# Patient Record
Sex: FEMALE | Race: WHITE
[De-identification: names, ages, dates, MRNs, and addresses within clinical notes are randomized per-mention and may not be internally consistent; named-entity substitution may affect disease eponyms.]

---
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
## 2018-01-11 ENCOUNTER — HOSPITAL ENCOUNTER (EMERGENCY)
Dept: HOSPITAL 62 - ER | Age: 18
LOS: 1 days | Discharge: HOME | End: 2018-01-12
Payer: COMMERCIAL

## 2018-01-11 DIAGNOSIS — E10.65: ICD-10-CM

## 2018-01-11 DIAGNOSIS — O21.9: ICD-10-CM

## 2018-01-11 DIAGNOSIS — F17.210: ICD-10-CM

## 2018-01-11 DIAGNOSIS — O99.331: ICD-10-CM

## 2018-01-11 DIAGNOSIS — Z3A.01: ICD-10-CM

## 2018-01-11 DIAGNOSIS — O24.011: Primary | ICD-10-CM

## 2018-01-11 LAB
APPEARANCE UR: CLEAR
APTT PPP: (no result) S
BILIRUB UR QL STRIP: NEGATIVE
GLUCOSE UR STRIP-MCNC: >=500 MG/DL
KETONES UR STRIP-MCNC: 20 MG/DL
NITRITE UR QL STRIP: NEGATIVE
PH UR STRIP: 7 [PH] (ref 5–9)
PROT UR STRIP-MCNC: NEGATIVE MG/DL
SP GR UR STRIP: 1.02
UROBILINOGEN UR-MCNC: NEGATIVE MG/DL (ref ?–2)

## 2018-01-11 PROCEDURE — 36415 COLL VENOUS BLD VENIPUNCTURE: CPT

## 2018-01-11 PROCEDURE — 81001 URINALYSIS AUTO W/SCOPE: CPT

## 2018-01-11 PROCEDURE — 96360 HYDRATION IV INFUSION INIT: CPT

## 2018-01-11 PROCEDURE — 93976 VASCULAR STUDY: CPT

## 2018-01-11 PROCEDURE — 99406 BEHAV CHNG SMOKING 3-10 MIN: CPT

## 2018-01-11 PROCEDURE — 76817 TRANSVAGINAL US OBSTETRIC: CPT

## 2018-01-11 PROCEDURE — 82962 GLUCOSE BLOOD TEST: CPT

## 2018-01-11 PROCEDURE — 82803 BLOOD GASES ANY COMBINATION: CPT

## 2018-01-11 PROCEDURE — 80053 COMPREHEN METABOLIC PANEL: CPT

## 2018-01-11 PROCEDURE — 81025 URINE PREGNANCY TEST: CPT

## 2018-01-11 PROCEDURE — 99285 EMERGENCY DEPT VISIT HI MDM: CPT

## 2018-01-11 PROCEDURE — 84702 CHORIONIC GONADOTROPIN TEST: CPT

## 2018-01-11 PROCEDURE — 85025 COMPLETE CBC W/AUTO DIFF WBC: CPT

## 2018-01-12 VITALS — DIASTOLIC BLOOD PRESSURE: 62 MMHG | SYSTOLIC BLOOD PRESSURE: 105 MMHG

## 2018-01-12 LAB
ADD MANUAL DIFF: NO
ALBUMIN SERPL-MCNC: 4.6 G/DL (ref 3.7–5.6)
ALP SERPL-CCNC: 117 U/L (ref 50–135)
ALT SERPL-CCNC: 34 U/L (ref 5–35)
ANION GAP SERPL CALC-SCNC: 21 MMOL/L (ref 5–19)
AST SERPL-CCNC: 20 U/L (ref 5–30)
BASE EXCESS BLDV CALC-SCNC: -3 MMOL/L
BASOPHILS # BLD AUTO: 0.1 10^3/UL (ref 0–0.2)
BASOPHILS NFR BLD AUTO: 0.4 % (ref 0–2)
BILIRUB DIRECT SERPL-MCNC: 0.3 MG/DL (ref 0–0.4)
BILIRUB SERPL-MCNC: 1 MG/DL (ref 0.2–1.3)
BUN SERPL-MCNC: 15 MG/DL (ref 7–20)
CALCIUM: 10.7 MG/DL (ref 8.4–10.2)
CHLORIDE SERPL-SCNC: 89 MMOL/L (ref 98–107)
CO2 SERPL-SCNC: 19 MMOL/L (ref 22–30)
EOSINOPHIL # BLD AUTO: 0 10^3/UL (ref 0–0.6)
EOSINOPHIL NFR BLD AUTO: 0.3 % (ref 0–6)
ERYTHROCYTE [DISTWIDTH] IN BLOOD BY AUTOMATED COUNT: 14.5 % (ref 11.5–14)
GLUCOSE SERPL-MCNC: 603 MG/DL (ref 75–110)
HCO3 BLDV-SCNC: 22.5 MMOL/L (ref 20–32)
HCT VFR BLD CALC: 41.1 % (ref 36–47)
HGB BLD-MCNC: 13 G/DL (ref 12–15.5)
LYMPHOCYTES # BLD AUTO: 2.9 10^3/UL (ref 0.5–4.7)
LYMPHOCYTES NFR BLD AUTO: 20.1 % (ref 13–45)
MCH RBC QN AUTO: 29.5 PG (ref 27–33.4)
MCHC RBC AUTO-ENTMCNC: 31.7 G/DL (ref 32–36)
MCV RBC AUTO: 93 FL (ref 80–97)
MONOCYTES # BLD AUTO: 0.8 10^3/UL (ref 0.1–1.4)
MONOCYTES NFR BLD AUTO: 5.8 % (ref 3–13)
NEUTROPHILS # BLD AUTO: 10.7 10^3/UL (ref 1.7–8.2)
NEUTS SEG NFR BLD AUTO: 73.4 % (ref 42–78)
PCO2 BLDV: 41.9 MMHG (ref 35–63)
PH BLDV: 7.35 [PH] (ref 7.3–7.42)
PLATELET # BLD: 396 10^3/UL (ref 150–450)
POTASSIUM SERPL-SCNC: 5.2 MMOL/L (ref 3.6–5)
PROT SERPL-MCNC: 6.9 G/DL (ref 6.3–8.2)
RBC # BLD AUTO: 4.42 10^6/UL (ref 3.72–5.28)
SODIUM SERPL-SCNC: 128.7 MMOL/L (ref 137–145)
TOTAL CELLS COUNTED % (AUTO): 100 %
WBC # BLD AUTO: 14.6 10^3/UL (ref 4–10.5)

## 2018-01-12 NOTE — ER DOCUMENT REPORT
ED General





- General


Chief Complaint: High Blood Sugar


Stated Complaint: HIGH BLOOD SUGAR


Time Seen by Provider: 01/12/18 00:45


Mode of Arrival: Medic


Information source: Patient, Law Enforcement, Emergency Med Personnel


Notes: 





17-year-old female presents from half-way with complaints of hyperglycemia.  

Patient has a history of diabetes type 1, patient notes that in retirement for the 

past 2 weeks she has not been receiving her insulin off the sliding scale that 

incorporates her meals. pt denies any vaginal bleeding ,discharge, denies any 

urinary complaints 


TRAVEL OUTSIDE OF THE [place] IN LAST 30 DAYS: No





- HPI


Onset: Just prior to arrival


Onset/Duration: Sudden


Quality of pain: No pain


Severity: Mild


Pain Level: Denies


Associated symptoms: Nausea, Vomiting


Exacerbated by: Denies


Relieved by: Supine


Similar symptoms previously: Yes


Recently seen / treated by doctor: Yes





- Related Data


Allergies/Adverse Reactions: 


 





No Known Allergies Allergy (Verified 01/11/18 22:46)


 











Past Medical History





- Social History


Smoking Status: Current Every Day Smoker


Cigarette use (# per day): Yes


Chew tobacco use (# tins/day): No


Smoking Education Provided: Yes - Patient counselled regarding cessation for 4 

minutes


Family History: Reviewed & Not Pertinent


Patient has suicidal ideation: No


Patient has homicidal ideation: No


Endocrine Medical History: Reports: Hx Diabetes Mellitus Type 1, Hx Diabetes 

Mellitus Type 2


Renal/ Medical History: Denies: Hx Peritoneal Dialysis


Psychiatric Medical History: Reports: Hx Depression





- Immunizations


Immunizations up to date: Yes


Hx Diphtheria, Pertussis, Tetanus Vaccination: Yes





Review of Systems





- Review of Systems


Notes: 





REVIEW OF SYSTEMS:


CONSTITUTIONAL :  Denies fever,  chills, or sweats.  Denies recent illness.


EENT:   Denies eye, ear, throat, or mouth pain or symptoms.  Denies nasal or 

sinus congestion or discharge.  Denies throat, tongue, or mouth swelling or 

difficulty swallowing.


CARDIOVASCULAR:  Denies chest pain.  Denies palpitations or racing or irregular 

heart beat.  Denies ankle edema.


RESPIRATORY:  Denies cough, cold, or chest congestion.  Denies shortness of 

breath, difficulty breathing, or wheezing.


GASTROINTESTINAL: Admits to nausea vomiting


GENITOURINARY:  Denies difficulty urinating, painful urination, burning, 

frequency, blood in urine, or discharge.


FEMALE  GENITOURINARY:  Denies vaginal bleeding, heavy or abnormal periods, 

irregular periods.  Denies vaginal discharge or odor. 


MUSCULOSKELETAL:  Denies back or neck pain or stiffness.  Denies joint pain or 

swelling.


SKIN:   Denies rash, lesions or sores.


HEMATOLOGIC :   Denies easy bruising or bleeding.


LYMPHATIC:  Denies swollen, enlarged glands.


NEUROLOGICAL:  Denies confusion or altered mental status.  Denies passing out 

or loss of consciousness.  Denies dizziness or lightheadedness.  Denies 

headache.  Denies weakness or paralysis or loss of use of either side.  Denies 

problems with gait or speech.  Denies sensory loss, numbness, or tingling.  

Denies seizures.


PSYCHIATRIC:  Denies anxiety or stress.  Denies depression, suicidal ideation, 

or homicidal ideation.





ALL OTHER SYSTEMS REVIEWED AND NEGATIVE.











PHYSICAL EXAMINATION:





GENERAL: Well-appearing, well-nourished and in no acute distress.





HEAD: Atraumatic, normocephalic.





EYES: Pupils equal round and reactive to light, extraocular movements intact, 

conjunctiva are normal.





ENT: Nares patent, oropharynx clear without exudates.  Moist mucous membranes.





NECK: Normal range of motion, supple without lymphadenopathy





LUNGS: Breath sounds clear to auscultation bilaterally and equal.  No wheezes 

rales or rhonchi.





HEART: Regular rate and rhythm without murmurs





ABDOMEN: Soft, nontender, nondistended abdomen.  No guarding, no rebound.  No 

masses appreciated.





Female : deferred





Musculoskeletal: Normal range of motion, no pitting or edema.  No cyanosis.





NEUROLOGICAL: Cranial nerves grossly intact.  Normal speech, normal gait.  

Normal sensory, motor exams





PSYCH: Normal mood, normal affect.





SKIN: Warm, Dry, normal turgor, no rashes or lesions noted.

















Dictation was performed using Dragon voice recognition software





Physical Exam





- Vital signs


Vitals: 


 











Temp Pulse Resp BP Pulse Ox


 


 98.4 F   86   18   128/71 H  98 


 


 01/11/18 22:49  01/11/18 22:49  01/11/18 22:49  01/11/18 22:49  01/11/18 22:49














Course





- Re-evaluation


Re-evalutation: 


On arrival patient is noted to have a blood sugar over 600, she overall looks 

well was placed on the monitor.  IV fluids were started and patient was given 

insulin bolus.  I believe her hyperglycemia secondary to medications not being 

appropriate


01/12/18 04:25


Patient's blood sugars improved significantly is down to 182 at this time, hcg 

is positive 


01/12/18 06:21


Ultrasound was consistent with a 6 week pregnancy, patient was made aware of 

this, she states she had Raynaud's that she is pregnant, she denies any 

complaints otherwise








Given the patient's symptoms have improved as well as her glucose has improved 

I believe she is stable for discharge.  Very strict return precautions have 

been provided, prenatal vitamins have been written for the patient








After performing a Medical Screening Examination, I estimate there is LOW risk 

for ACUTE APPENDICITIS, BOWEL OBSTRUCTION, ACUTE CHOLECYSTITIS, PERFORATED 

DIVERTICULITIS, INCARCERATED HERNIA, PANCREATITIS, PELVIC INFLAMMATORY DISEASE, 

PERFORATED ULCER, ECTOPIC PREGNANCY, or TUBO-OVARIAN ABSCESS, thus I consider 

the discharge disposition reasonable. Also, there is no evidence or peritonitis

, sepsis, or toxicity. I have reevaluated this patient multiple times and no 

significant life threatening changes are noted. The patient and I have 

discussed the diagnosis and risks, and we agree with discharging home with 

close follow-up with the understanding that symptoms and presentations can 

change. We also discussed returning to the Emergency Department immediately if 

new or worsening symptoms occur. We have discussed the symptoms which are most 

concerning (e.g., bloody stool, fever, changing or worsening pain, vomiting) 

that necessitate immediate return.





- Vital Signs


Vital signs: 


 











Temp Pulse Resp BP Pulse Ox


 


 98.4 F   86   18   128/71 H  98 


 


 01/11/18 22:49  01/11/18 22:49  01/11/18 22:49  01/11/18 22:49  01/11/18 22:49














- Laboratory


Result Diagrams: 


 01/12/18 01:00





 01/12/18 01:00


Laboratory results interpreted by me: 


 











  01/11/18 01/11/18 01/12/18





  23:13 23:13 01:00


 


WBC    14.6 H


 


MCHC    31.7 L


 


RDW    14.5 H


 


Absolute Neutrophils    10.7 H


 


Sodium   


 


Potassium   


 


Chloride   


 


Carbon Dioxide   


 


Anion Gap   


 


Glucose   


 


POC Glucose   


 


Calcium   


 


Beta HCG, Quant   


 


Urine Glucose (UA)  >=500 H  


 


Urine Ketones  20 H  


 


Ur Leukocyte Esterase  TRACE H  


 


Urine HCG, Qual   POSITIVE H 














  01/12/18 01/12/18 01/12/18





  01:00 01:00 02:58


 


WBC   


 


MCHC   


 


RDW   


 


Absolute Neutrophils   


 


Sodium  128.7 L  


 


Potassium  5.2 H  


 


Chloride  89 L  


 


Carbon Dioxide  19 L  


 


Anion Gap  21 H  


 


Glucose  603 H*  


 


POC Glucose    387 H


 


Calcium  10.7 H  


 


Beta HCG, Quant   47231.00 H 


 


Urine Glucose (UA)   


 


Urine Ketones   


 


Ur Leukocyte Esterase   


 


Urine HCG, Qual   














  01/12/18





  04:24


 


WBC 


 


MCHC 


 


RDW 


 


Absolute Neutrophils 


 


Sodium 


 


Potassium 


 


Chloride 


 


Carbon Dioxide 


 


Anion Gap 


 


Glucose 


 


POC Glucose  182 H


 


Calcium 


 


Beta HCG, Quant 


 


Urine Glucose (UA) 


 


Urine Ketones 


 


Ur Leukocyte Esterase 


 


Urine HCG, Qual 














Critical Care Note





- Critical Care Note


Total time excluding time spent on procedures (mins): 34


Comments: 





34 minutes of critical care time spent in direct contact evaluating and 

reevaluating the patient, treating symptoms, reviewing labs and studies and 

speaking with family  and consultants excluding any procedures





Discharge





- Discharge


Clinical Impression: 


 Hyperglycemia, Pregnancy with 6 completed weeks gestation





Condition: Stable


Disposition: HOME, SELF-CARE


Instructions:  Hyperglycemia ([facility])


Additional Instructions: 


Please provide patient with insulin on sliding scale as well as calculating per 

meal as she does at home





Please provide prenatal vitamin daily





Return immediately if there are any other concerns


Prescriptions: 


[id number]/Iron Fum/Folic/Docusate [Prenatal 19 Tablet] 1 each PO DAILY #30 tablet

## 2018-01-12 NOTE — RADIOLOGY REPORT (SQ)
EXAM DESCRIPTION: U/S OB TRANSVAG W/DOPPLER



CLINICAL HISTORY: 17 years Female, + preg



COMPARISON: None.



TECHNIQUE: Complete transvaginal first trimester obstetrical

ultrasound.



FINDINGS: 



The uterus measures 4.6 x 5.5 x 4.4 cm. Cervix measures 2.5 cm.

Within the endometrial canal there is a gestational sac with a

yolk sac measuring 0.45 cm and a fetal pole with a crown-rump

length of 0.32 cm. This compatible with an estimated gestational

age of 6 weeks, 0 days. No fetal heart rate identified. No

myometrial abnormalities.



Trace free pelvic fluid.



The right ovary measures 2.1 x 3.1 x 2.3 cm. The left ovary

measures 2.1 x 3.3 x 1.6 cm. Limited color and spectral Doppler

imaging demonstrates flow within the ovaries bilaterally.



IMPRESSION:



1. There is a single intrauterine pregnancy with estimated

gestational age of 6 weeks, 0 days by crown-rump length. No fetal

cardiac activity identified. Differential considerations include

fetal demise and early normal pregnancy. Close continued

clinical, laboratory, and sonographic follow-up recommended.

## 2018-06-19 ENCOUNTER — HOSPITAL ENCOUNTER (EMERGENCY)
Dept: HOSPITAL 62 - ER | Age: 18
LOS: 1 days | Discharge: HOME | End: 2018-06-20
Payer: COMMERCIAL

## 2018-06-19 DIAGNOSIS — F17.210: ICD-10-CM

## 2018-06-19 DIAGNOSIS — Z79.4: ICD-10-CM

## 2018-06-19 DIAGNOSIS — E10.65: Primary | ICD-10-CM

## 2018-06-19 DIAGNOSIS — R55: ICD-10-CM

## 2018-06-19 DIAGNOSIS — R53.1: ICD-10-CM

## 2018-06-19 PROCEDURE — 81001 URINALYSIS AUTO W/SCOPE: CPT

## 2018-06-19 PROCEDURE — 36415 COLL VENOUS BLD VENIPUNCTURE: CPT

## 2018-06-19 PROCEDURE — 82803 BLOOD GASES ANY COMBINATION: CPT

## 2018-06-19 PROCEDURE — 80053 COMPREHEN METABOLIC PANEL: CPT

## 2018-06-19 PROCEDURE — 99285 EMERGENCY DEPT VISIT HI MDM: CPT

## 2018-06-19 PROCEDURE — 85025 COMPLETE CBC W/AUTO DIFF WBC: CPT

## 2018-06-19 PROCEDURE — 82962 GLUCOSE BLOOD TEST: CPT

## 2018-06-20 VITALS — DIASTOLIC BLOOD PRESSURE: 77 MMHG | SYSTOLIC BLOOD PRESSURE: 120 MMHG

## 2018-06-20 LAB
ADD MANUAL DIFF: NO
ALBUMIN SERPL-MCNC: 4 G/DL (ref 3.7–5.6)
ALP SERPL-CCNC: 108 U/L (ref 50–135)
ALT SERPL-CCNC: 31 U/L (ref 5–35)
ANION GAP SERPL CALC-SCNC: 16 MMOL/L (ref 5–19)
APPEARANCE UR: CLEAR
APTT PPP: (no result) S
AST SERPL-CCNC: 16 U/L (ref 5–30)
BASE EXCESS BLDV CALC-SCNC: -2.8 MMOL/L
BASOPHILS # BLD AUTO: 0 10^3/UL (ref 0–0.2)
BASOPHILS NFR BLD AUTO: 0.5 % (ref 0–2)
BILIRUB DIRECT SERPL-MCNC: 0.3 MG/DL (ref 0–0.4)
BILIRUB SERPL-MCNC: 1.2 MG/DL (ref 0.2–1.3)
BILIRUB UR QL STRIP: NEGATIVE
BUN SERPL-MCNC: 18 MG/DL (ref 7–20)
CALCIUM: 9.5 MG/DL (ref 8.4–10.2)
CHLORIDE SERPL-SCNC: 94 MMOL/L (ref 98–107)
CO2 SERPL-SCNC: 23 MMOL/L (ref 22–30)
EOSINOPHIL # BLD AUTO: 0.1 10^3/UL (ref 0–0.6)
EOSINOPHIL NFR BLD AUTO: 0.5 % (ref 0–6)
ERYTHROCYTE [DISTWIDTH] IN BLOOD BY AUTOMATED COUNT: 12.9 % (ref 11.5–14)
GLUCOSE SERPL-MCNC: 589 MG/DL (ref 75–110)
GLUCOSE UR STRIP-MCNC: >=500 MG/DL
HCO3 BLDV-SCNC: 22.4 MMOL/L (ref 20–32)
HCT VFR BLD CALC: 38.6 % (ref 36–47)
HGB BLD-MCNC: 12.6 G/DL (ref 12–15.5)
KETONES UR STRIP-MCNC: 20 MG/DL
LYMPHOCYTES # BLD AUTO: 2.6 10^3/UL (ref 0.5–4.7)
LYMPHOCYTES NFR BLD AUTO: 24.4 % (ref 13–45)
MCH RBC QN AUTO: 28.8 PG (ref 27–33.4)
MCHC RBC AUTO-ENTMCNC: 32.7 G/DL (ref 32–36)
MCV RBC AUTO: 88 FL (ref 80–97)
MONOCYTES # BLD AUTO: 0.8 10^3/UL (ref 0.1–1.4)
MONOCYTES NFR BLD AUTO: 7.9 % (ref 3–13)
NEUTROPHILS # BLD AUTO: 7.2 10^3/UL (ref 1.7–8.2)
NEUTS SEG NFR BLD AUTO: 66.7 % (ref 42–78)
NITRITE UR QL STRIP: NEGATIVE
PCO2 BLDV: 40.5 MMHG (ref 35–63)
PH BLDV: 7.36 [PH] (ref 7.3–7.42)
PH UR STRIP: 5 [PH] (ref 5–9)
PLATELET # BLD: 320 10^3/UL (ref 150–450)
POTASSIUM SERPL-SCNC: 4.9 MMOL/L (ref 3.6–5)
PROT SERPL-MCNC: 6.6 G/DL (ref 6.3–8.2)
PROT UR STRIP-MCNC: NEGATIVE MG/DL
RBC # BLD AUTO: 4.37 10^6/UL (ref 3.72–5.28)
SODIUM SERPL-SCNC: 132.6 MMOL/L (ref 137–145)
SP GR UR STRIP: 1.02
TOTAL CELLS COUNTED % (AUTO): 100 %
UROBILINOGEN UR-MCNC: NEGATIVE MG/DL (ref ?–2)
WBC # BLD AUTO: 10.8 10^3/UL (ref 4–10.5)

## 2018-06-20 NOTE — ER DOCUMENT REPORT
ED General





- General


Chief Complaint: High Blood Sugar


Stated Complaint: POSSIBLE HIGH GLUCOSE


Time Seen by Provider: 06/20/18 00:01


Mode of Arrival: Ambulatory


Information source: Patient, Parent


Notes: 





17 yr old female presents with hx of type 1 diabetes who is supposed to be on 

novolog with complaints of high blood sugar and feeling faint. pt denies any 

fevers or chills, denies any pain. pt noted that she ran out of her novolog and 

hasnt had it today. the pcp has not filled the medication in the pharmacy. 


TRAVEL OUTSIDE OF THE [place] IN LAST 30 DAYS: No





- HPI


Onset: Just prior to arrival


Onset/Duration: Sudden


Quality of pain: No pain


Severity: Moderate


Pain Level: Denies


Associated symptoms: Weakness


Exacerbated by: Denies


Relieved by: Denies


Similar symptoms previously: Yes


Recently seen / treated by doctor: Yes





- Related Data


Allergies/Adverse Reactions: 


 





No Known Allergies Allergy (Verified 01/11/18 22:46)


 











Past Medical History





- Social History


Smoking Status: Current Every Day Smoker


Cigarette use (# per day): Yes


Chew tobacco use (# tins/day): No


Smoking Education Provided: No


Family History: Reviewed & Not Pertinent


Endocrine Medical History: Reports: Hx Diabetes Mellitus Type 1, Hx Diabetes 

Mellitus Type 2


Renal/ Medical History: Denies: Hx Peritoneal Dialysis


Psychiatric Medical History: Reports: Hx Depression





- Immunizations


Immunizations up to date: Yes


Hx Diphtheria, Pertussis, Tetanus Vaccination: Yes





Review of Systems





- Review of Systems


Notes: 





REVIEW OF SYSTEMS:


CONSTITUTIONAL :  Denies fever,  chills, or sweats.  Denies recent illness.


EENT:   Denies eye, ear, throat, or mouth pain or symptoms.  Denies nasal or 

sinus congestion or discharge.  Denies throat, tongue, or mouth swelling or 

difficulty swallowing.


CARDIOVASCULAR:  Denies chest pain.  Denies palpitations or racing or irregular 

heart beat.  Denies ankle edema.


RESPIRATORY:  Denies cough, cold, or chest congestion.  Denies shortness of 

breath, difficulty breathing, or wheezing.


GASTROINTESTINAL:  Denies abdominal pain or distention.  Denies nausea, vomiting

, or diarrhea.  Denies blood in vomitus, stools, or per rectum.  Denies black, 

tarry stools.  Denies constipation. 


GENITOURINARY:  Denies difficulty urinating, painful urination, burning, 

frequency, blood in urine, or discharge.


FEMALE  GENITOURINARY:  Denies vaginal bleeding, heavy or abnormal periods, 

irregular periods.  Denies vaginal discharge or odor. 


MUSCULOSKELETAL:  Denies back or neck pain or stiffness.  Denies joint pain or 

swelling.


SKIN:   Denies rash, lesions or sores.


HEMATOLOGIC :   Denies easy bruising or bleeding.


LYMPHATIC:  Denies swollen, enlarged glands.


NEUROLOGICAL:  weakness


PSYCHIATRIC:  Denies anxiety or stress.  Denies depression, suicidal ideation, 

or homicidal ideation.





ALL OTHER SYSTEMS REVIEWED AND NEGATIVE.











PHYSICAL EXAMINATION:





GENERAL: Well-appearing, well-nourished and in no acute distress.





HEAD: Atraumatic, normocephalic.





EYES: Pupils equal round and reactive to light, extraocular movements intact, 

conjunctiva are normal.





ENT: Nares patent, oropharynx clear without exudates.  Moist mucous membranes.





NECK: Normal range of motion, supple without lymphadenopathy





LUNGS: Breath sounds clear to auscultation bilaterally and equal.  No wheezes 

rales or rhonchi.





HEART: Regular rate and rhythm without murmurs





ABDOMEN: Soft, nontender, nondistended abdomen.  No guarding, no rebound.  No 

masses appreciated.





Female : deferred





Musculoskeletal: Normal range of motion, no pitting or edema.  No cyanosis.





NEUROLOGICAL: Cranial nerves grossly intact.  Normal speech, normal gait.  

Normal sensory, motor exams





PSYCH: Normal mood, normal affect.





SKIN: Warm, Dry, normal turgor, no rashes or lesions noted. Ankle alarm noted 

















Dictation was performed using Dragon voice recognition software





Physical Exam





- Vital signs


Vitals: 


 











Temp Pulse Resp BP Pulse Ox


 


 98.3 F   95 H  18   128/73 H  97 


 


 06/19/18 22:57  06/19/18 22:57  06/19/18 22:57  06/19/18 22:57  06/19/18 22:57














Course





- Re-evaluation


Re-evalutation: 





06/20/18 00:09


pt overall looks well, does not appear to be in dka, her complaints seem to be 

secondary to lack of her medication, will treat with iv fluids and insulin. 

mother notes she can get medication in AM if i write it for her 


06/20/18 02:08


pts blood sugar has improved, she wishes to go home, will dc with mother and 

close return precautions 





After performing a Medical Screening Examination, I estimate there is LOW risk 

for ACUTE CORONARY SYNDROME, PULMONARY EMBOLI, RESPIRATORY FAILURE, SEPSIS OR 

MENINGITIS, thus I consider the discharge disposition reasonable.  I have 

reevaluated this patient multiple times and no significant life threatening 

changes are noted. The patient and I have discussed the diagnosis and risks, 

and we agree with discharging home with close follow-up. We also discussed 

returning to the Emergency Department immediately if new or worsening symptoms 

occur. We have discussed the symptoms which are most concerning (e.g., changing 

or worsening pain, trouble swallowing or breathing, neck stiffness, fever) that 

necessitate immediate return.





- Vital Signs


Vital signs: 


 











Temp Pulse Resp BP Pulse Ox


 


 98.3 F   95 H  18   128/73 H  97 


 


 06/19/18 22:57  06/19/18 22:57  06/19/18 22:57  06/19/18 22:57  06/19/18 22:57














- Laboratory


Result Diagrams: 


 06/20/18 00:07





 06/20/18 00:07


Laboratory results interpreted by me: 


 











  06/20/18 06/20/18 06/20/18





  00:07 00:07 01:03


 


WBC  10.8 H  


 


Sodium   132.6 L 


 


Chloride   94 L 


 


Glucose   589 H* 


 


Urine Glucose (UA)    >=500 H


 


Urine Ketones    20 H


 


Urine Blood    LARGE H














Discharge





- Discharge


Clinical Impression: 


 Hyperglycemia due to type 1 diabetes mellitus





Condition: Stable


Disposition: HOME, SELF-CARE


Instructions:  Hyperglycemia ([facility])


Prescriptions: 


Insulin Aspart [Novolog Insulin 100 Unit/1 ml 10 ml] 1 unit SUBCUT .SLD SCALE #

10 ml


Referrals: 


MILADIS DANIELSON MD [Primary Care Provider] - Follow up tomorrow

## 2019-01-22 ENCOUNTER — HOSPITAL ENCOUNTER (EMERGENCY)
Dept: HOSPITAL 62 - ER | Age: 19
Discharge: HOME | End: 2019-01-22
Payer: MEDICARE

## 2019-01-22 VITALS — DIASTOLIC BLOOD PRESSURE: 95 MMHG | SYSTOLIC BLOOD PRESSURE: 138 MMHG

## 2019-01-22 DIAGNOSIS — E10.9: ICD-10-CM

## 2019-01-22 DIAGNOSIS — R11.2: Primary | ICD-10-CM

## 2019-01-22 LAB
ADD MANUAL DIFF: NO
ALBUMIN SERPL-MCNC: 4.3 G/DL (ref 3.5–5)
ALP SERPL-CCNC: 108 U/L (ref 38–126)
ALT SERPL-CCNC: 34 U/L (ref 9–52)
ANION GAP SERPL CALC-SCNC: 9 MMOL/L (ref 5–19)
AST SERPL-CCNC: 18 U/L (ref 14–36)
BASOPHILS # BLD AUTO: 0.1 10^3/UL (ref 0–0.2)
BASOPHILS NFR BLD AUTO: 0.7 % (ref 0–2)
BILIRUB DIRECT SERPL-MCNC: 0.1 MG/DL (ref 0–0.4)
BILIRUB SERPL-MCNC: 0.3 MG/DL (ref 0.2–1.3)
BUN SERPL-MCNC: 8 MG/DL (ref 7–20)
CALCIUM: 9.6 MG/DL (ref 8.4–10.2)
CHLORIDE SERPL-SCNC: 104 MMOL/L (ref 98–107)
CO2 SERPL-SCNC: 26 MMOL/L (ref 22–30)
EOSINOPHIL # BLD AUTO: 0.1 10^3/UL (ref 0–0.6)
EOSINOPHIL NFR BLD AUTO: 0.6 % (ref 0–6)
ERYTHROCYTE [DISTWIDTH] IN BLOOD BY AUTOMATED COUNT: 13.8 % (ref 11.5–14)
GLUCOSE SERPL-MCNC: 230 MG/DL (ref 75–110)
HCT VFR BLD CALC: 38.3 % (ref 36–47)
HGB BLD-MCNC: 12.7 G/DL (ref 12–15.5)
LYMPHOCYTES # BLD AUTO: 3.1 10^3/UL (ref 0.5–4.7)
LYMPHOCYTES NFR BLD AUTO: 26.5 % (ref 13–45)
MCH RBC QN AUTO: 28.7 PG (ref 27–33.4)
MCHC RBC AUTO-ENTMCNC: 33.2 G/DL (ref 32–36)
MCV RBC AUTO: 87 FL (ref 80–97)
MONOCYTES # BLD AUTO: 0.7 10^3/UL (ref 0.1–1.4)
MONOCYTES NFR BLD AUTO: 5.8 % (ref 3–13)
NEUTROPHILS # BLD AUTO: 7.7 10^3/UL (ref 1.7–8.2)
NEUTS SEG NFR BLD AUTO: 66.4 % (ref 42–78)
PLATELET # BLD: 459 10^3/UL (ref 150–450)
POTASSIUM SERPL-SCNC: 4.5 MMOL/L (ref 3.6–5)
PROT SERPL-MCNC: 6.9 G/DL (ref 6.3–8.2)
RBC # BLD AUTO: 4.42 10^6/UL (ref 3.72–5.28)
SODIUM SERPL-SCNC: 139.2 MMOL/L (ref 137–145)
TOTAL CELLS COUNTED % (AUTO): 100 %
WBC # BLD AUTO: 11.6 10^3/UL (ref 4–10.5)

## 2019-01-22 PROCEDURE — 80053 COMPREHEN METABOLIC PANEL: CPT

## 2019-01-22 PROCEDURE — 36415 COLL VENOUS BLD VENIPUNCTURE: CPT

## 2019-01-22 PROCEDURE — 96361 HYDRATE IV INFUSION ADD-ON: CPT

## 2019-01-22 PROCEDURE — 96374 THER/PROPH/DIAG INJ IV PUSH: CPT

## 2019-01-22 PROCEDURE — 85025 COMPLETE CBC W/AUTO DIFF WBC: CPT

## 2019-01-22 PROCEDURE — 99284 EMERGENCY DEPT VISIT MOD MDM: CPT

## 2019-01-22 NOTE — ER DOCUMENT REPORT
ED GI/





- General


Chief Complaint: Vomiting


Stated Complaint: VOMITING


Time Seen by Provider: 01/22/19 13:44


Primary Care Provider: 


MILADIS DANIELSON MD [Primary Care Provider] - Follow up as needed


Mode of Arrival: Ambulatory


Notes: 








Patient is a 18-year-old female with history of type 1 diabetes who presents 

with vomiting.  Patient reports she began vomiting last night and has been 

vomiting through the night into today.  Patient reports she has vomited at least

6 times.  Patient denies any diarrhea or fever.  Patient denies any abdominal 

pain.  





TRAVEL OUTSIDE OF THE [place] IN LAST 30 DAYS: No





- Related Data


Allergies/Adverse Reactions: 


                                        





No Known Allergies Allergy (Verified 01/22/19 12:25)


   











Past Medical History





- General


Information source: Patient





- Social History


Smoking Status: Never Smoker


Frequency of alcohol use: None


Drug Abuse: None


Family History: Reviewed & Not Pertinent


Endocrine Medical History: Reports: Hx Diabetes Mellitus Type 1


Renal/ Medical History: Denies: Hx Peritoneal Dialysis


Psychiatric Medical History: Reports: Hx Depression


Surgical Hx: Negative





- Immunizations


Immunizations up to date: Yes


Hx Diphtheria, Pertussis, Tetanus Vaccination: Yes





Review of Systems





- Review of Systems


Constitutional: No symptoms reported


EENT: No symptoms reported


Cardiovascular: No symptoms reported


Respiratory: No symptoms reported


Gastrointestinal: Nausea, Vomiting


Genitourinary: No symptoms reported


Female Genitourinary: No symptoms reported


Musculoskeletal: No symptoms reported


Skin: No symptoms reported


Hematologic/Lymphatic: No symptoms reported


Neurological/Psychological: No symptoms reported





Physical Exam





- Vital signs


Vitals: 


                                        











Temp Pulse Resp BP Pulse Ox


 


 98.3 F   106 H  18   138/75 H  99 


 


 01/22/19 13:13  01/22/19 13:13  01/22/19 13:13  01/22/19 13:13  01/22/19 13:13














- Notes


Notes: 





PHYSICAL EXAMINATION:





GENERAL: Well-appearing, well-nourished and in no acute distress.





HEAD: Atraumatic, normocephalic.





EYES: Pupils equal round and reactive to light, extraocular movements intact, 

conjunctiva are normal.





ENT: Nares patent, oropharynx clear without exudates.  Moist mucous membranes.





NECK: Normal range of motion, supple without lymphadenopathy





LUNGS: Breath sounds clear to auscultation bilaterally and equal.  No wheezes 

rales or rhonchi.





HEART: Regular rate and rhythm without murmurs





ABDOMEN: Soft, nontender, nondistended abdomen.  No guarding, no rebound.  No 

masses appreciated.





Female : No CVA tenderness.





Musculoskeletal: Normal range of motion, no pitting or edema.  No cyanosis.





NEUROLOGICAL: Cranial nerves grossly intact.  Normal speech, normal gait.  

Normal sensory, motor exams





PSYCH: Normal mood, normal affect.





SKIN: Warm, Dry, normal turgor, no rashes or lesions noted.





Course





- Re-evaluation


Re-evalutation: 





01/22/19 15:46


CBC and comprehensive have resulted on her both unremarkable.  There is no sunny

dence of this patient being in diabetic ketoacidosis.  Patient's anion gap is 

normal.  Patient's urinalysis is not resulted and her VBG was misplaced by the 

lab.  Patient is adamant that she needs to go home because her mother just broke

her wrist and she needs to check on her.  Patient has no abdominal pain and has 

not vomited since arrival.  Patient looks well and her vital signs are normal at

this time.  Patient was mildly tachycardic with a heart rate of 106 on arrival. 

I did discuss with patient that should she worsen in any way as outlined by her 

discharge instructions she is to return to the emergency department immediately.





- Vital Signs


Vital signs: 


                                        











Temp Pulse Resp BP Pulse Ox


 


 98.5 F   93   16   138/95 H  100 


 


 01/22/19 15:46  01/22/19 15:46  01/22/19 15:46  01/22/19 15:46  01/22/19 15:46














- Laboratory


Result Diagrams: 


                                 01/22/19 15:05





                                 01/22/19 15:05


Laboratory results interpreted by me: 


                                        











  01/22/19 01/22/19





  15:05 15:05


 


WBC  11.6 H 


 


Plt Count  459 H 


 


Glucose   230 H














Discharge





- Discharge


Clinical Impression: 


Vomiting


Qualifiers:


 Vomiting type: unspecified Vomiting Intractability: unspecified Nausea presence

: with nausea Qualified Code(s): R11.2 - Nausea with vomiting, unspecified





Condition: Stable


Disposition: HOME, SELF-CARE


Instructions:  Antinausea Medication ([facility]), Intravenous (IV) Fluids ([facility]), 

Vomiting ([facility])


Additional Instructions: 


You are seen in the emergency department today for vomiting.  Your initial blood

work looks okay.  You have opted to leave the emergency department before the 

rest of your blood work and urine are back.  Please return to the emergency 

department if you develop worsening of your symptoms such as persistent vomiting

despite taking the antinausea medicine, development of fever, abdominal pain or 

any other symptom that is worrisome to you.  We are happy to reevaluate you at 

any time.


Prescriptions: 


Ondansetron [Zofran Odt 4 mg Tablet] 1 - 2 tab PO Q4H PRN #15 tab.rapdis


 PRN Reason: For Nausea/Vomiting


Forms:  Return to Work


Referrals: 


MILADIS DANIELSON MD [Primary Care Provider] - Follow up as needed

## 2019-01-22 NOTE — ER DOCUMENT REPORT
ED Medical Screen (RME)





- General


Chief Complaint: Vomiting


Stated Complaint: VOMITING


Time Seen by Provider: 01/22/19 13:44


Primary Care Provider: 


MILADIS DANIELSON MD [Primary Care Provider] - Follow up as needed


Mode of Arrival: Ambulatory


Information source: Patient


Notes: 





Patient is a 18-year-old female with history of type 1 diabetes who presents 

with vomiting.  Patient reports she began vomiting last night and has been 

vomiting through the night into today.  Patient reports she has vomited at least

6 times.  Patient denies any diarrhea or fever.  Patient denies any abdominal 

pain.  





Exam:


Heart sounds S1-S2 present, tachycardia noted.








I have greeted and performed a rapid initial assessment of this patient.  A 

comprehensive ED assessment and evaluation of the patient, analysis of test 

results and completion of the medical decision making process will be conducted 

by additional ED providers.  Dictation of this chart was performed using voice 

recognition software; therefore, there may be some unintended grammatical 

errors.


TRAVEL OUTSIDE OF THE [place] IN LAST 30 DAYS: No





- Related Data


Allergies/Adverse Reactions: 


                                        





No Known Allergies Allergy (Verified 01/22/19 12:25)


   











Past Medical History


Endocrine Medical History: Reports: Hx Diabetes Mellitus Type 1, Hx Diabetes 

Mellitus Type 2


Renal/ Medical History: Denies: Hx Peritoneal Dialysis


Psychiatric Medical History: Reports: Hx Depression





- Immunizations


Immunizations up to date: Yes


Hx Diphtheria, Pertussis, Tetanus Vaccination: Yes





Physical Exam





- Vital signs


Vitals: 





                                        











Temp Pulse Resp BP Pulse Ox


 


 98.3 F   106 H  18   138/75 H  99 


 


 01/22/19 13:13  01/22/19 13:13  01/22/19 13:13  01/22/19 13:13  01/22/19 13:13














Course





- Vital Signs


Vital signs: 





                                        











Temp Pulse Resp BP Pulse Ox


 


 98.3 F   106 H  18   138/75 H  99 


 


 01/22/19 13:13  01/22/19 13:13  01/22/19 13:13  01/22/19 13:13  01/22/19 13:13














Doctor's Discharge





- Discharge


Referrals: 


MILADIS DANIELSON MD [Primary Care Provider] - Follow up as needed